# Patient Record
Sex: MALE | Race: WHITE | Employment: OTHER | URBAN - METROPOLITAN AREA
[De-identification: names, ages, dates, MRNs, and addresses within clinical notes are randomized per-mention and may not be internally consistent; named-entity substitution may affect disease eponyms.]

---

## 2017-01-01 ENCOUNTER — APPOINTMENT (OUTPATIENT)
Dept: GENERAL RADIOLOGY | Facility: HOSPITAL | Age: 72
DRG: 208 | End: 2017-01-01
Attending: HOSPITALIST
Payer: MEDICARE

## 2017-01-01 ENCOUNTER — OFFICE VISIT (OUTPATIENT)
Dept: CARDIOLOGY CLINIC | Facility: HOSPITAL | Age: 72
End: 2017-01-01
Attending: INTERNAL MEDICINE
Payer: MEDICARE

## 2017-01-01 ENCOUNTER — ANESTHESIA (OUTPATIENT)
Dept: MEDSURG UNIT | Facility: HOSPITAL | Age: 72
DRG: 208 | End: 2017-01-01
Payer: MEDICARE

## 2017-01-01 ENCOUNTER — APPOINTMENT (OUTPATIENT)
Dept: GENERAL RADIOLOGY | Facility: HOSPITAL | Age: 72
DRG: 208 | End: 2017-01-01
Attending: EMERGENCY MEDICINE
Payer: MEDICARE

## 2017-01-01 ENCOUNTER — APPOINTMENT (OUTPATIENT)
Dept: GENERAL RADIOLOGY | Facility: HOSPITAL | Age: 72
DRG: 208 | End: 2017-01-01
Attending: ANESTHESIOLOGY
Payer: MEDICARE

## 2017-01-01 ENCOUNTER — ANESTHESIA EVENT (OUTPATIENT)
Dept: MEDSURG UNIT | Facility: HOSPITAL | Age: 72
DRG: 208 | End: 2017-01-01
Payer: MEDICARE

## 2017-01-01 ENCOUNTER — APPOINTMENT (OUTPATIENT)
Dept: GENERAL RADIOLOGY | Facility: HOSPITAL | Age: 72
DRG: 208 | End: 2017-01-01
Attending: INTERNAL MEDICINE
Payer: MEDICARE

## 2017-01-01 ENCOUNTER — TELEPHONE (OUTPATIENT)
Dept: MEDSURG UNIT | Facility: HOSPITAL | Age: 72
End: 2017-01-01

## 2017-01-01 ENCOUNTER — HOSPITAL ENCOUNTER (INPATIENT)
Facility: HOSPITAL | Age: 72
LOS: 5 days | DRG: 208 | End: 2017-01-01
Attending: EMERGENCY MEDICINE | Admitting: HOSPITALIST
Payer: MEDICARE

## 2017-01-01 VITALS
RESPIRATION RATE: 18 BRPM | DIASTOLIC BLOOD PRESSURE: 72 MMHG | HEART RATE: 73 BPM | BODY MASS INDEX: 27 KG/M2 | OXYGEN SATURATION: 95 % | SYSTOLIC BLOOD PRESSURE: 115 MMHG | WEIGHT: 184 LBS

## 2017-01-01 VITALS
BODY MASS INDEX: 24.38 KG/M2 | WEIGHT: 178 LBS | DIASTOLIC BLOOD PRESSURE: 33 MMHG | SYSTOLIC BLOOD PRESSURE: 44 MMHG | OXYGEN SATURATION: 71 % | TEMPERATURE: 97 F | HEIGHT: 71.5 IN

## 2017-01-01 DIAGNOSIS — J96.21 ACUTE ON CHRONIC RESPIRATORY FAILURE WITH HYPOXIA (HCC): Primary | ICD-10-CM

## 2017-01-01 DIAGNOSIS — J84.10 PULMONARY FIBROSIS (HCC): ICD-10-CM

## 2017-01-01 DIAGNOSIS — I50.9 HEART FAILURE, UNSPECIFIED (HCC): Primary | ICD-10-CM

## 2017-01-01 DIAGNOSIS — E05.90 THYROTOXICOSIS: ICD-10-CM

## 2017-01-01 LAB
ANION GAP SERPL CALC-SCNC: 10 MMOL/L (ref 0–18)
ANION GAP SERPL CALC-SCNC: 13 MMOL/L (ref 0–18)
ANION GAP SERPL CALC-SCNC: 5 MMOL/L (ref 0–18)
ANION GAP SERPL CALC-SCNC: 6 MMOL/L (ref 0–18)
ANION GAP SERPL CALC-SCNC: 6 MMOL/L (ref 0–18)
ANION GAP SERPL CALC-SCNC: 7 MMOL/L (ref 0–18)
ANION GAP SERPL CALC-SCNC: 8 MMOL/L (ref 0–18)
BASE EXCESS BLD CALC-SCNC: 0.4 MMOL/L (ref ?–2)
BASE EXCESS BLD CALC-SCNC: 4.1 MMOL/L (ref ?–2)
BASOPHILS # BLD: 0 K/UL (ref 0–0.2)
BASOPHILS # BLD: 0.1 K/UL (ref 0–0.2)
BASOPHILS NFR BLD: 0 %
BLOOD GAS EPAP: 5 CM H2O
BLOOD GAS IPAP: 10 CM H2O
BNP SERPL-MCNC: 847 PG/ML (ref 0–100)
BUN SERPL-MCNC: 28 MG/DL (ref 8–20)
BUN SERPL-MCNC: 29 MG/DL (ref 8–20)
BUN SERPL-MCNC: 39 MG/DL (ref 8–20)
BUN SERPL-MCNC: 51 MG/DL (ref 8–20)
BUN SERPL-MCNC: 60 MG/DL (ref 8–20)
BUN SERPL-MCNC: 70 MG/DL (ref 8–20)
BUN SERPL-MCNC: 75 MG/DL (ref 8–20)
BUN/CREAT SERPL: 14.4 (ref 10–20)
BUN/CREAT SERPL: 17.9 (ref 10–20)
BUN/CREAT SERPL: 28.1 (ref 10–20)
BUN/CREAT SERPL: 31.4 (ref 10–20)
BUN/CREAT SERPL: 35.5 (ref 10–20)
BUN/CREAT SERPL: 37 (ref 10–20)
BUN/CREAT SERPL: 38 (ref 10–20)
CALCIUM SERPL-MCNC: 7.9 MG/DL (ref 8.5–10.5)
CALCIUM SERPL-MCNC: 8.6 MG/DL (ref 8.5–10.5)
CALCIUM SERPL-MCNC: 8.7 MG/DL (ref 8.5–10.5)
CALCIUM SERPL-MCNC: 8.9 MG/DL (ref 8.5–10.5)
CALCIUM SERPL-MCNC: 9.1 MG/DL (ref 8.5–10.5)
CALCIUM SERPL-MCNC: 9.2 MG/DL (ref 8.5–10.5)
CALCIUM SERPL-MCNC: 9.5 MG/DL (ref 8.5–10.5)
CHLORIDE SERPL-SCNC: 100 MMOL/L (ref 95–110)
CHLORIDE SERPL-SCNC: 102 MMOL/L (ref 95–110)
CHLORIDE SERPL-SCNC: 103 MMOL/L (ref 95–110)
CHLORIDE SERPL-SCNC: 105 MMOL/L (ref 95–110)
CHLORIDE SERPL-SCNC: 105 MMOL/L (ref 95–110)
CHLORIDE SERPL-SCNC: 98 MMOL/L (ref 95–110)
CHLORIDE SERPL-SCNC: 99 MMOL/L (ref 95–110)
CO2 SERPL-SCNC: 20 MMOL/L (ref 22–32)
CO2 SERPL-SCNC: 24 MMOL/L (ref 22–32)
CO2 SERPL-SCNC: 26 MMOL/L (ref 22–32)
CO2 SERPL-SCNC: 27 MMOL/L (ref 22–32)
CO2 SERPL-SCNC: 28 MMOL/L (ref 22–32)
CO2 SERPL-SCNC: 29 MMOL/L (ref 22–32)
CO2 SERPL-SCNC: 29 MMOL/L (ref 22–32)
CREAT SERPL-MCNC: 1.38 MG/DL (ref 0.5–1.5)
CREAT SERPL-MCNC: 1.39 MG/DL (ref 0.5–1.5)
CREAT SERPL-MCNC: 1.62 MG/DL (ref 0.5–1.5)
CREAT SERPL-MCNC: 1.69 MG/DL (ref 0.5–1.5)
CREAT SERPL-MCNC: 1.84 MG/DL (ref 0.5–1.5)
CREAT SERPL-MCNC: 1.95 MG/DL (ref 0.5–1.5)
CREAT SERPL-MCNC: 2.39 MG/DL (ref 0.5–1.5)
EOSINOPHIL # BLD: 0 K/UL (ref 0–0.7)
EOSINOPHIL NFR BLD: 0 %
ERYTHROCYTE [DISTWIDTH] IN BLOOD BY AUTOMATED COUNT: 17.5 % (ref 11–15)
ERYTHROCYTE [DISTWIDTH] IN BLOOD BY AUTOMATED COUNT: 17.6 % (ref 11–15)
ERYTHROCYTE [DISTWIDTH] IN BLOOD BY AUTOMATED COUNT: 17.7 % (ref 11–15)
ERYTHROCYTE [DISTWIDTH] IN BLOOD BY AUTOMATED COUNT: 18.1 % (ref 11–15)
ERYTHROCYTE [DISTWIDTH] IN BLOOD BY AUTOMATED COUNT: 18.3 % (ref 11–15)
GLUCOSE BLDC GLUCOMTR-MCNC: 148 MG/DL (ref 70–99)
GLUCOSE BLDC GLUCOMTR-MCNC: 149 MG/DL (ref 70–99)
GLUCOSE BLDC GLUCOMTR-MCNC: 165 MG/DL (ref 70–99)
GLUCOSE BLDC GLUCOMTR-MCNC: 172 MG/DL (ref 70–99)
GLUCOSE BLDC GLUCOMTR-MCNC: 175 MG/DL (ref 70–99)
GLUCOSE BLDC GLUCOMTR-MCNC: 180 MG/DL (ref 70–99)
GLUCOSE BLDC GLUCOMTR-MCNC: 181 MG/DL (ref 70–99)
GLUCOSE BLDC GLUCOMTR-MCNC: 183 MG/DL (ref 70–99)
GLUCOSE BLDC GLUCOMTR-MCNC: 196 MG/DL (ref 70–99)
GLUCOSE BLDC GLUCOMTR-MCNC: 209 MG/DL (ref 70–99)
GLUCOSE BLDC GLUCOMTR-MCNC: 213 MG/DL (ref 70–99)
GLUCOSE BLDC GLUCOMTR-MCNC: 214 MG/DL (ref 70–99)
GLUCOSE BLDC GLUCOMTR-MCNC: 217 MG/DL (ref 70–99)
GLUCOSE BLDC GLUCOMTR-MCNC: 218 MG/DL (ref 70–99)
GLUCOSE BLDC GLUCOMTR-MCNC: 230 MG/DL (ref 70–99)
GLUCOSE BLDC GLUCOMTR-MCNC: 234 MG/DL (ref 70–99)
GLUCOSE BLDC GLUCOMTR-MCNC: 238 MG/DL (ref 70–99)
GLUCOSE BLDC GLUCOMTR-MCNC: 242 MG/DL (ref 70–99)
GLUCOSE BLDC GLUCOMTR-MCNC: 245 MG/DL (ref 70–99)
GLUCOSE BLDC GLUCOMTR-MCNC: 248 MG/DL (ref 70–99)
GLUCOSE BLDC GLUCOMTR-MCNC: 256 MG/DL (ref 70–99)
GLUCOSE BLDC GLUCOMTR-MCNC: 260 MG/DL (ref 70–99)
GLUCOSE SERPL-MCNC: 130 MG/DL (ref 70–99)
GLUCOSE SERPL-MCNC: 184 MG/DL (ref 70–99)
GLUCOSE SERPL-MCNC: 187 MG/DL (ref 70–99)
GLUCOSE SERPL-MCNC: 189 MG/DL (ref 70–99)
GLUCOSE SERPL-MCNC: 192 MG/DL (ref 70–99)
GLUCOSE SERPL-MCNC: 239 MG/DL (ref 70–99)
GLUCOSE SERPL-MCNC: 246 MG/DL (ref 70–99)
HBA1C MFR BLD: 5.1 % (ref 4–6)
HCO3 BLDA-SCNC: 24.1 MEQ/L (ref 21–27)
HCO3 BLDA-SCNC: 26.6 MEQ/L (ref 21–27)
HCT VFR BLD AUTO: 32.8 % (ref 41–52)
HCT VFR BLD AUTO: 34.4 % (ref 41–52)
HCT VFR BLD AUTO: 39 % (ref 41–52)
HCT VFR BLD AUTO: 39.5 % (ref 41–52)
HCT VFR BLD AUTO: 40 % (ref 41–52)
HGB BLD-MCNC: 10.7 G/DL (ref 13.5–17.5)
HGB BLD-MCNC: 11.1 G/DL (ref 13.5–17.5)
HGB BLD-MCNC: 12.4 G/DL (ref 13.5–17.5)
HGB BLD-MCNC: 12.7 G/DL (ref 13.5–17.5)
HGB BLD-MCNC: 12.8 G/DL (ref 13.5–17.5)
LYMPHOCYTES # BLD: 0.5 K/UL (ref 1–4)
LYMPHOCYTES # BLD: 0.7 K/UL (ref 1–4)
LYMPHOCYTES # BLD: 0.9 K/UL (ref 1–4)
LYMPHOCYTES NFR BLD: 3 %
LYMPHOCYTES NFR BLD: 4 %
LYMPHOCYTES NFR BLD: 6 %
MAGNESIUM SERPL-MCNC: 2.1 MG/DL (ref 1.8–2.5)
MAGNESIUM SERPL-MCNC: 2.2 MG/DL (ref 1.8–2.5)
MAGNESIUM SERPL-MCNC: 2.2 MG/DL (ref 1.8–2.5)
MAGNESIUM SERPL-MCNC: 2.5 MG/DL (ref 1.8–2.5)
MCH RBC QN AUTO: 26.8 PG (ref 27–32)
MCH RBC QN AUTO: 27.1 PG (ref 27–32)
MCH RBC QN AUTO: 27.1 PG (ref 27–32)
MCH RBC QN AUTO: 27.2 PG (ref 27–32)
MCH RBC QN AUTO: 27.4 PG (ref 27–32)
MCHC RBC AUTO-ENTMCNC: 31.8 G/DL (ref 32–37)
MCHC RBC AUTO-ENTMCNC: 31.9 G/DL (ref 32–37)
MCHC RBC AUTO-ENTMCNC: 32.2 G/DL (ref 32–37)
MCHC RBC AUTO-ENTMCNC: 32.3 G/DL (ref 32–37)
MCHC RBC AUTO-ENTMCNC: 32.8 G/DL (ref 32–37)
MCV RBC AUTO: 83.7 FL (ref 80–100)
MCV RBC AUTO: 83.9 FL (ref 80–100)
MCV RBC AUTO: 84.1 FL (ref 80–100)
MCV RBC AUTO: 84.2 FL (ref 80–100)
MCV RBC AUTO: 85.3 FL (ref 80–100)
MODIFIED ALLEN TEST: POSITIVE
MODIFIED ALLEN TEST: POSITIVE
MONOCYTES # BLD: 0.8 K/UL (ref 0–1)
MONOCYTES # BLD: 0.8 K/UL (ref 0–1)
MONOCYTES # BLD: 1.1 K/UL (ref 0–1)
MONOCYTES # BLD: 1.1 K/UL (ref 0–1)
MONOCYTES # BLD: 1.2 K/UL (ref 0–1)
MONOCYTES NFR BLD: 5 %
MONOCYTES NFR BLD: 5 %
MONOCYTES NFR BLD: 6 %
MONOCYTES NFR BLD: 7 %
MONOCYTES NFR BLD: 8 %
MRSA DNA SPEC QL NAA+PROBE: NEGATIVE
NEUTROPHILS # BLD AUTO: 12.4 K/UL (ref 1.8–7.7)
NEUTROPHILS # BLD AUTO: 14.7 K/UL (ref 1.8–7.7)
NEUTROPHILS # BLD AUTO: 15.2 K/UL (ref 1.8–7.7)
NEUTROPHILS # BLD AUTO: 15.4 K/UL (ref 1.8–7.7)
NEUTROPHILS # BLD AUTO: 17.3 K/UL (ref 1.8–7.7)
NEUTROPHILS NFR BLD: 85 %
NEUTROPHILS NFR BLD: 89 %
NEUTROPHILS NFR BLD: 91 %
NEUTROPHILS NFR BLD: 92 %
NEUTROPHILS NFR BLD: 92 %
O2 CT BLD-SCNC: 14.9 VOL% (ref 15–23)
O2 CT BLD-SCNC: 15.5 VOL% (ref 15–23)
O2/TOTAL GAS SETTING VFR VENT: 100 %
O2/TOTAL GAS SETTING VFR VENT: 100 %
OSMOLALITY UR CALC.SUM OF ELEC: 286 MOSM/KG (ref 275–295)
OSMOLALITY UR CALC.SUM OF ELEC: 287 MOSM/KG (ref 275–295)
OSMOLALITY UR CALC.SUM OF ELEC: 296 MOSM/KG (ref 275–295)
OSMOLALITY UR CALC.SUM OF ELEC: 306 MOSM/KG (ref 275–295)
OSMOLALITY UR CALC.SUM OF ELEC: 307 MOSM/KG (ref 275–295)
OSMOLALITY UR CALC.SUM OF ELEC: 308 MOSM/KG (ref 275–295)
OSMOLALITY UR CALC.SUM OF ELEC: 312 MOSM/KG (ref 275–295)
PCO2 BLDA: 33 MM HG (ref 35–45)
PCO2 BLDA: 37 MM HG (ref 35–45)
PEEP SETTING VENT: 5 CM H2O
PH BLDA: 7.43 [PH] (ref 7.35–7.45)
PH BLDA: 7.51 [PH] (ref 7.35–7.45)
PLATELET # BLD AUTO: 167 K/UL (ref 140–400)
PLATELET # BLD AUTO: 178 K/UL (ref 140–400)
PLATELET # BLD AUTO: 179 K/UL (ref 140–400)
PLATELET # BLD AUTO: 214 K/UL (ref 140–400)
PLATELET # BLD AUTO: 215 K/UL (ref 140–400)
PMV BLD AUTO: 9.2 FL (ref 7.4–10.3)
PMV BLD AUTO: 9.5 FL (ref 7.4–10.3)
PMV BLD AUTO: 9.7 FL (ref 7.4–10.3)
PO2 BLDA: 52 MM HG (ref 80–100)
PO2 BLDA: 88 MM HG (ref 80–100)
PO2 SATN ADJ TO 0.5 BLD: 26 MMHG (ref 24–28)
PO2 SATN ADJ TO 0.5 BLD: 29 MMHG (ref 24–28)
POTASSIUM SERPL-SCNC: 4.3 MMOL/L (ref 3.3–5.1)
POTASSIUM SERPL-SCNC: 4.6 MMOL/L (ref 3.3–5.1)
POTASSIUM SERPL-SCNC: 4.7 MMOL/L (ref 3.3–5.1)
POTASSIUM SERPL-SCNC: 4.8 MMOL/L (ref 3.3–5.1)
POTASSIUM SERPL-SCNC: 4.8 MMOL/L (ref 3.3–5.1)
POTASSIUM SERPL-SCNC: 5 MMOL/L (ref 3.3–5.1)
POTASSIUM SERPL-SCNC: 5.1 MMOL/L (ref 3.3–5.1)
PUNCTURE CHARGE: YES
PUNCTURE CHARGE: YES
RBC # BLD AUTO: 3.91 M/UL (ref 4.5–5.9)
RBC # BLD AUTO: 4.09 M/UL (ref 4.5–5.9)
RBC # BLD AUTO: 4.57 M/UL (ref 4.5–5.9)
RBC # BLD AUTO: 4.71 M/UL (ref 4.5–5.9)
RBC # BLD AUTO: 4.76 M/UL (ref 4.5–5.9)
RESP RATE: 14 BPM
SAO2 % BLDA: 87 % (ref 94–100)
SAO2 % BLDA: 96 % (ref 94–100)
SODIUM SERPL-SCNC: 131 MMOL/L (ref 136–144)
SODIUM SERPL-SCNC: 133 MMOL/L (ref 136–144)
SODIUM SERPL-SCNC: 135 MMOL/L (ref 136–144)
SODIUM SERPL-SCNC: 136 MMOL/L (ref 136–144)
SODIUM SERPL-SCNC: 136 MMOL/L (ref 136–144)
SODIUM SERPL-SCNC: 139 MMOL/L (ref 136–144)
SODIUM SERPL-SCNC: 140 MMOL/L (ref 136–144)
SPECIMEN VOL 24H UR: 550 ML
T3FREE SERPL-MCNC: 2.85 PG/ML (ref 2.53–4.29)
T4 FREE SERPL-MCNC: 1.14 NG/DL (ref 0.58–1.64)
TSH SERPL-ACNC: 2.87 UIU/ML (ref 0.34–5.6)
WBC # BLD AUTO: 14.5 K/UL (ref 4–11)
WBC # BLD AUTO: 16.5 K/UL (ref 4–11)
WBC # BLD AUTO: 16.6 K/UL (ref 4–11)
WBC # BLD AUTO: 16.7 K/UL (ref 4–11)
WBC # BLD AUTO: 19 K/UL (ref 4–11)

## 2017-01-01 PROCEDURE — 71010 XR CHEST AP PORTABLE  (CPT=71010): CPT

## 2017-01-01 PROCEDURE — 99233 SBSQ HOSP IP/OBS HIGH 50: CPT | Performed by: HOSPITALIST

## 2017-01-01 PROCEDURE — 84443 ASSAY THYROID STIM HORMONE: CPT | Performed by: CLINICAL NURSE SPECIALIST

## 2017-01-01 PROCEDURE — 99214 OFFICE O/P EST MOD 30 MIN: CPT | Performed by: CLINICAL NURSE SPECIALIST

## 2017-01-01 PROCEDURE — 80048 BASIC METABOLIC PNL TOTAL CA: CPT | Performed by: CLINICAL NURSE SPECIALIST

## 2017-01-01 PROCEDURE — 99233 SBSQ HOSP IP/OBS HIGH 50: CPT | Performed by: INTERNAL MEDICINE

## 2017-01-01 PROCEDURE — 99291 CRITICAL CARE FIRST HOUR: CPT | Performed by: INTERNAL MEDICINE

## 2017-01-01 PROCEDURE — 99223 1ST HOSP IP/OBS HIGH 75: CPT | Performed by: HOSPITALIST

## 2017-01-01 PROCEDURE — 5A1945Z RESPIRATORY VENTILATION, 24-96 CONSECUTIVE HOURS: ICD-10-PCS | Performed by: INTERNAL MEDICINE

## 2017-01-01 PROCEDURE — 0BH17EZ INSERTION OF ENDOTRACHEAL AIRWAY INTO TRACHEA, VIA NATURAL OR ARTIFICIAL OPENING: ICD-10-PCS | Performed by: ANESTHESIOLOGY

## 2017-01-01 PROCEDURE — 36415 COLL VENOUS BLD VENIPUNCTURE: CPT | Performed by: CLINICAL NURSE SPECIALIST

## 2017-01-01 PROCEDURE — 84481 FREE ASSAY (FT-3): CPT | Performed by: CLINICAL NURSE SPECIALIST

## 2017-01-01 PROCEDURE — 84439 ASSAY OF FREE THYROXINE: CPT | Performed by: CLINICAL NURSE SPECIALIST

## 2017-01-01 PROCEDURE — 99238 HOSP IP/OBS DSCHRG MGMT 30/<: CPT | Performed by: HOSPITALIST

## 2017-01-01 PROCEDURE — 99211 OFF/OP EST MAY X REQ PHY/QHP: CPT | Performed by: CLINICAL NURSE SPECIALIST

## 2017-01-01 PROCEDURE — 99223 1ST HOSP IP/OBS HIGH 75: CPT | Performed by: INTERNAL MEDICINE

## 2017-01-01 RX ORDER — IPRATROPIUM BROMIDE AND ALBUTEROL SULFATE 2.5; .5 MG/3ML; MG/3ML
3 SOLUTION RESPIRATORY (INHALATION) AS NEEDED
Status: DISCONTINUED | OUTPATIENT
Start: 2017-01-01 | End: 2017-01-01

## 2017-01-01 RX ORDER — SPIRONOLACTONE 25 MG/1
25 TABLET ORAL DAILY
Status: DISCONTINUED | OUTPATIENT
Start: 2017-01-01 | End: 2017-01-01

## 2017-01-01 RX ORDER — IPRATROPIUM BROMIDE AND ALBUTEROL SULFATE 2.5; .5 MG/3ML; MG/3ML
3 SOLUTION RESPIRATORY (INHALATION)
Status: DISCONTINUED | OUTPATIENT
Start: 2017-01-01 | End: 2017-01-01

## 2017-01-01 RX ORDER — ISOSORBIDE DINITRATE 10 MG/1
10 TABLET ORAL
Status: DISCONTINUED | OUTPATIENT
Start: 2017-01-01 | End: 2017-01-01

## 2017-01-01 RX ORDER — METHIMAZOLE 10 MG/1
5 TABLET ORAL EVERY MORNING
Status: DISCONTINUED | OUTPATIENT
Start: 2017-01-01 | End: 2017-01-01

## 2017-01-01 RX ORDER — METHYLPREDNISOLONE SODIUM SUCCINATE 40 MG/ML
40 INJECTION, POWDER, LYOPHILIZED, FOR SOLUTION INTRAMUSCULAR; INTRAVENOUS EVERY 6 HOURS
Status: DISCONTINUED | OUTPATIENT
Start: 2017-01-01 | End: 2017-01-01

## 2017-01-01 RX ORDER — FUROSEMIDE 10 MG/ML
40 INJECTION INTRAMUSCULAR; INTRAVENOUS
Status: DISCONTINUED | OUTPATIENT
Start: 2017-01-01 | End: 2017-01-01

## 2017-01-01 RX ORDER — METHYLPREDNISOLONE SODIUM SUCCINATE 40 MG/ML
40 INJECTION, POWDER, LYOPHILIZED, FOR SOLUTION INTRAMUSCULAR; INTRAVENOUS EVERY 12 HOURS
Status: COMPLETED | OUTPATIENT
Start: 2017-01-01 | End: 2017-01-01

## 2017-01-01 RX ORDER — MORPHINE SULFATE 2 MG/ML
INJECTION, SOLUTION INTRAMUSCULAR; INTRAVENOUS
Status: COMPLETED
Start: 2017-01-01 | End: 2017-01-01

## 2017-01-01 RX ORDER — SODIUM CHLORIDE 0.9 % (FLUSH) 0.9 %
SYRINGE (ML) INJECTION
Status: COMPLETED
Start: 2017-01-01 | End: 2017-01-01

## 2017-01-01 RX ORDER — 0.9 % SODIUM CHLORIDE 0.9 %
VIAL (ML) INJECTION
Status: DISCONTINUED
Start: 2017-01-01 | End: 2017-01-01

## 2017-01-01 RX ORDER — ROSUVASTATIN CALCIUM 20 MG/1
20 TABLET, COATED ORAL NIGHTLY
Status: DISCONTINUED | OUTPATIENT
Start: 2017-01-01 | End: 2017-01-01

## 2017-01-01 RX ORDER — FUROSEMIDE 10 MG/ML
INJECTION INTRAMUSCULAR; INTRAVENOUS
Status: COMPLETED
Start: 2017-01-01 | End: 2017-01-01

## 2017-01-01 RX ORDER — DIPHENHYDRAMINE HCL 25 MG
25 CAPSULE ORAL ONCE
Status: COMPLETED | OUTPATIENT
Start: 2017-01-01 | End: 2017-01-01

## 2017-01-01 RX ORDER — DIPHENHYDRAMINE HCL 25 MG
25 CAPSULE ORAL NIGHTLY PRN
Status: DISCONTINUED | OUTPATIENT
Start: 2017-01-01 | End: 2017-01-01

## 2017-01-01 RX ORDER — MORPHINE SULFATE 2 MG/ML
1 INJECTION, SOLUTION INTRAMUSCULAR; INTRAVENOUS ONCE
Status: COMPLETED | OUTPATIENT
Start: 2017-01-01 | End: 2017-01-01

## 2017-01-01 RX ORDER — METHYLPREDNISOLONE SODIUM SUCCINATE 40 MG/ML
40 INJECTION, POWDER, LYOPHILIZED, FOR SOLUTION INTRAMUSCULAR; INTRAVENOUS EVERY 12 HOURS
Status: DISCONTINUED | OUTPATIENT
Start: 2017-01-01 | End: 2017-01-01

## 2017-01-01 RX ORDER — SODIUM CHLORIDE 9 MG/ML
INJECTION, SOLUTION INTRAVENOUS CONTINUOUS
Status: DISCONTINUED | OUTPATIENT
Start: 2017-01-01 | End: 2017-01-01

## 2017-01-01 RX ORDER — METHIMAZOLE 10 MG/1
5 TABLET ORAL ONCE
Status: COMPLETED | OUTPATIENT
Start: 2017-01-01 | End: 2017-01-01

## 2017-01-01 RX ORDER — FUROSEMIDE 10 MG/ML
40 INJECTION INTRAMUSCULAR; INTRAVENOUS DAILY
Status: DISCONTINUED | OUTPATIENT
Start: 2017-01-01 | End: 2017-01-01

## 2017-01-01 RX ORDER — ASPIRIN 81 MG/1
81 TABLET ORAL DAILY
Status: DISCONTINUED | OUTPATIENT
Start: 2017-01-01 | End: 2017-01-01

## 2017-01-01 RX ORDER — 0.9 % SODIUM CHLORIDE 0.9 %
VIAL (ML) INJECTION
Status: COMPLETED
Start: 2017-01-01 | End: 2017-01-01

## 2017-01-01 RX ORDER — ONDANSETRON 2 MG/ML
4 INJECTION INTRAMUSCULAR; INTRAVENOUS EVERY 6 HOURS PRN
Status: DISCONTINUED | OUTPATIENT
Start: 2017-01-01 | End: 2017-01-01

## 2017-01-01 RX ORDER — PREDNISONE 20 MG/1
40 TABLET ORAL
Status: DISCONTINUED | OUTPATIENT
Start: 2017-01-01 | End: 2017-01-01

## 2017-01-01 RX ORDER — PREDNISONE 10 MG/1
TABLET ORAL
Qty: 100 TABLET | Refills: 0 | Status: SHIPPED | OUTPATIENT
Start: 2017-01-01

## 2017-01-01 RX ORDER — HYDRALAZINE HYDROCHLORIDE 10 MG/1
10 TABLET, FILM COATED ORAL 3 TIMES DAILY
Status: DISCONTINUED | OUTPATIENT
Start: 2017-01-01 | End: 2017-01-01

## 2017-01-01 RX ORDER — METHYLPREDNISOLONE SODIUM SUCCINATE 125 MG/2ML
125 INJECTION, POWDER, LYOPHILIZED, FOR SOLUTION INTRAMUSCULAR; INTRAVENOUS ONCE
Status: COMPLETED | OUTPATIENT
Start: 2017-01-01 | End: 2017-01-01

## 2017-01-01 RX ORDER — ISOSORBIDE DINITRATE 10 MG/1
10 TABLET ORAL 3 TIMES DAILY
Status: DISCONTINUED | OUTPATIENT
Start: 2017-01-01 | End: 2017-01-01

## 2017-01-01 RX ORDER — SODIUM CHLORIDE 0.9 % (FLUSH) 0.9 %
SYRINGE (ML) INJECTION
Status: DISPENSED
Start: 2017-01-01 | End: 2017-01-01

## 2017-01-01 RX ORDER — DEXTROSE MONOHYDRATE 25 G/50ML
50 INJECTION, SOLUTION INTRAVENOUS AS NEEDED
Status: DISCONTINUED | OUTPATIENT
Start: 2017-01-01 | End: 2017-01-01

## 2017-01-01 RX ORDER — ROSUVASTATIN CALCIUM 20 MG/1
20 TABLET, COATED ORAL NIGHTLY
Qty: 30 TABLET | Refills: 4 | Status: SHIPPED | OUTPATIENT
Start: 2017-01-01

## 2017-01-01 RX ORDER — CARVEDILOL 12.5 MG/1
12.5 TABLET ORAL 2 TIMES DAILY WITH MEALS
Status: DISCONTINUED | OUTPATIENT
Start: 2017-01-01 | End: 2017-01-01

## 2017-01-01 RX ORDER — ACETAMINOPHEN 325 MG/1
650 TABLET ORAL EVERY 6 HOURS PRN
Status: DISCONTINUED | OUTPATIENT
Start: 2017-01-01 | End: 2017-01-01

## 2017-01-01 RX ORDER — SODIUM CHLORIDE 9 MG/ML
INJECTION, SOLUTION INTRAVENOUS
Status: COMPLETED
Start: 2017-01-01 | End: 2017-01-01

## 2017-01-01 RX ORDER — IPRATROPIUM BROMIDE AND ALBUTEROL SULFATE 2.5; .5 MG/3ML; MG/3ML
3 SOLUTION RESPIRATORY (INHALATION) ONCE
Status: COMPLETED | OUTPATIENT
Start: 2017-01-01 | End: 2017-01-01

## 2017-01-01 RX ORDER — DEXTROSE AND SODIUM CHLORIDE 5; .9 G/100ML; G/100ML
INJECTION, SOLUTION INTRAVENOUS CONTINUOUS
Status: DISCONTINUED | OUTPATIENT
Start: 2017-01-01 | End: 2017-01-01

## 2017-01-03 ENCOUNTER — PRIOR ORIGINAL RECORDS (OUTPATIENT)
Dept: OTHER | Age: 72
End: 2017-01-03

## 2017-01-09 NOTE — TELEPHONE ENCOUNTER
Pt called for refill:     Current outpatient prescriptions:     •  predniSONE 10 MG Oral Tab, Take 3 tablets by mouth daily, Disp: 100 tablet, Rfl: 0

## 2017-01-19 NOTE — PROGRESS NOTES
[facility]    1. Acute on chronic systolic heart failure - Stress 11/25/16- EF 46%  2. VT storm Hx - failed sotalol  3. ? Pulmonary Fibrosis/Amio Toxicity - amiodarone stopped           4. COPD  5. CAD Hx CABG  6. CKD  7. HTN  8.  H lightheadedness, chest pain, or palpitations. Reviewed disease process, na/fluid intake, medications; greater than 30 minutes spent on education - receptive. Trace BLE edema.  Tapering prednisone per Dr Varsha Arredondo for pulmonary fibrosis related to questionable a

## 2017-01-19 NOTE — PATIENT INSTRUCTIONS
Continue current medications  Continue tracking daily weights. Call with weight gain greater than 3 lbs overnight or concerning symptoms.   32-64 oz fluid restriction  2000 mg sodium/salt diet  Pacemaker clinic appointment 1/20/17  Appointment with Dr Garcia Gins

## 2017-01-20 ENCOUNTER — PRIOR ORIGINAL RECORDS (OUTPATIENT)
Dept: OTHER | Age: 72
End: 2017-01-20

## 2017-02-07 PROBLEM — R73.9 HYPERGLYCEMIA: Status: ACTIVE | Noted: 2017-01-01

## 2017-02-07 PROBLEM — E87.1 HYPONATREMIA: Status: ACTIVE | Noted: 2017-01-01

## 2017-02-07 PROBLEM — N17.9 ACUTE KIDNEY INJURY (HCC): Status: ACTIVE | Noted: 2017-01-01

## 2017-02-07 PROBLEM — E87.20 METABOLIC ACIDOSIS: Status: ACTIVE | Noted: 2017-01-01

## 2017-02-07 PROBLEM — J96.21 ACUTE ON CHRONIC RESPIRATORY FAILURE WITH HYPOXIA (HCC): Status: ACTIVE | Noted: 2017-01-01

## 2017-02-07 PROBLEM — E87.2 METABOLIC ACIDOSIS: Status: ACTIVE | Noted: 2017-01-01

## 2017-02-07 NOTE — ED INITIAL ASSESSMENT (HPI)
Patient has pulmonary fibrosis and has been on 4L O2 at home for the last several months. Patient began having increased shortness of breath today but decided not to go to dr. Tracie nevarez and EMS was called.  Patient was 45% SpO2 upon EMS arrival.

## 2017-02-07 NOTE — CONSULTS
[facility]    Report of Consultation    Yajaira Cortes Patient Status:  Inpatient    3/24/1945 MRN [id number]   Location [facility] 2W/SW Attending Amber Sheldon, [address] [facility] Day # 0 PCP Rosa Maria Holcomb MD     Date of Admiss s/p surgical thrombectomy 5/16   • Asthma    • Sleep apnea    • Calculus of kidney    • Heart attack [facility])    • Peripheral vascular disease (HCC)    • Disorder of thyroid      hyperthyroidism   • Carotid arterial disease (HCC)      s/p L CEA   • CVA (c (COREG) tab 12.5 mg 12.5 mg Oral BID with meals   hydrALAzine HCl (APRESOLINE) tab 10 mg 10 mg Oral TID   rivaroxaban (XARELTO) tab 15 mg 15 mg Oral Daily with food   Rosuvastatin Calcium (CRESTOR) 20 MG tab 20 mg 20 mg Oral Nightly   ondansetron HCl (ZOFR by mouth daily. MACHO MICROLET LANCETS Does not apply Misc 1 lancet by Finger stick route daily. Once daily and PRN. Glucose Blood (MACHO CONTOUR NEXT TEST) In Vitro Strip Check Blood Glucose once daily and PRN.    carvedilol 12.5 MG Oral Tab Take 12.5 CA 8.6 02/07/2017   ALB 2.8* 11/25/2016   ALKPHO 90 10/14/2016   TP 6.8 10/14/2016   AST 35 10/14/2016   ALT 51 10/14/2016   PTT 28.1 10/20/2016   INR 1.6* 11/23/2016   PTP 19.2* 11/23/2016   T4F 1.14 01/19/2017   TSH 2.87 01/19/2017   * 07/20/201 consistent with pulmonary fibrosis. May have component of acute on chronic systolic heart failure.   .  -Gentle diuresis as tolerated  -We will quickly wean steroid therapy  -Hold off antibiotics at this time  -Nightly CPAP  -Tolerating Vapotherm at

## 2017-02-07 NOTE — PROGRESS NOTES
See H and P from Dr Gaby Frank. Pt better off bipap, on vapor nc. Discussed with Dr Joanna Alicea. Unlikely copd. Will taper steroids. Cont iv lasix for now. Check BMP in am to monitor creatinine, which is above his baseline.

## 2017-02-07 NOTE — H&P
[address] [facility] Patient Status:  Inpatient    3/24/1945 MRN [id number]   Location [facility] 2W/ Attending Danita Fields MD   Hosp Day # 0 PCP Davida Montes De Oca MD     Date:  2017  Da thrombectomy 5/16   • Asthma    • Sleep apnea    • Calculus of kidney    • Heart attack [facility])    • Peripheral vascular disease (HCC)    • Disorder of thyroid      hyperthyroidism   • Carotid arterial disease (HCC)      s/p L CEA   • CVA (cerebral vascular Medications   Prescriptions Last Dose Informant Patient Reported? Taking? Albuterol Sulfate  (90 BASE) MCG/ACT Inhalation Aero Soln Unknown at Unknown time  No No   Sig: Inhale 2 puffs into the lungs 4 (four) times daily as needed for Wheezing. 2/6/2017 at 0800  No Yes   Sig: Take 1 tablet (25 mg total) by mouth daily. Facility-Administered Medications: None       Review of Systems:  Constitutional:  Weakness, Fatigue. Eye:  Negative. Ear/Nose/Mouth/Throat:  Negative.   Respiratory: Shortne 02/07/2017    02/07/2017   K 4.8 02/07/2017   CL 98 02/07/2017   CO2 20 02/07/2017    02/07/2017   CA 8.6 02/07/2017       Imaging:  No exam resulted this encounter.     Assessment and Plan:    Acute on chronic hypoxic respiratory failure  Like

## 2017-02-07 NOTE — ED PROVIDER NOTES
Patient Seen in: [facility]    History   Patient presents with:  Dyspnea PRAVIN SOB (respiratory)      HPI    Patient presents complaining of shortness of breath that started earlier today.   He states that he has a history of pul iliac endarterectomy, placement of 20 x 10 mm bifurcation graft from infrarenal abdominal aorta end-to-end to R common iliac & L common iliac    TONSILLECTOMY      HERNIA SURGERY  11/14/2012    Comment B/L percutaneous endoscopic external ring hernioplasty Disp:  Rfl:  2/6/2017 at [facility] Take 5 mg by mouth every morning. Take 5 mg in AM and 2.5 mg in the Pm  Disp:  Rfl:  2/6/2017 at 0800   spironolactone 25 MG Oral Tab Take 1 tablet (25 mg total) by mouth daily.  Disp: 90 tablet Rfl: 3 2/6/2017 Positive for shortness of breath. Negative for cough. Cardiovascular: Negative for chest pain and palpitations. Gastrointestinal: Negative for vomiting and abdominal pain. Genitourinary: Negative for dysuria and hematuria.    Musculoskeletal: Veronica Albrecht within normal limits   POCT GLUCOSE - Abnormal; Notable for the following:     POC Glucose  181 (*)     All other components within normal limits   CBC W/ DIFFERENTIAL - Abnormal; Notable for the following:     WBC 14.5 (*)     HGB 12.7 (*)     HCT 39.5 (* prominent interstitial markings bilaterally. The findings are nonspecific and could be related to the patient's underlying pulmonary fibrosis. Correlation can be made clinically for any superimposed interstitial pulmonary edema or pneumonitis.     Kelly Hammond acidosis E87.2 2/7/2017 Yes    Pulmonary fibrosis ([facility] [address] J84.10 Unknown Unknown

## 2017-02-08 NOTE — PLAN OF CARE
Diabetes/Glucose Control    • Glucose maintained within prescribed range Not Progressing    IV steroids, elevated blood sugar. High sliding scale coverage at meals.       RESPIRATORY - ADULT    • Achieves optimal ventilation and oxygenation Progressing

## 2017-02-08 NOTE — HISTORICAL OFFICE NOTE
Mouna Campos  : 1945  ACCOUNT:  [id number]  [phone]  PCP: Dr. Nikky Norris    TODAY'S DATE: 2016  DICTATED BY:  Tejal Jimenez M.D.]    CHIEF COMPLAINT: [Followup of .  CAD, of native vessels.]    HPI:  [On 2016, [employer] CONS: fatigue and weakness. EYES: denies significant visual changes. ENMT: denies difficulties with hearing, otherwise negative. CV: Denies chest pain, dizziness, palpitations. RESP: denies dyspnea, cough or wheezing. GI: denies melena, hematochezia.  : n CV: PALP: PMI not displaced, no lifts and thrills or rub. AUSC:  regular rhythm, normal S1, S2 without S3; no pathologic murmurs. CAROTIDS: carotid pulses normal and without bruits. FEM: deferred. PEDAL: pedal pulses intact. EXT: no peripheral edema.     DE 05/29/15 Multivitamin Adults 50          daily      Beka Soria M.D.    NILS/deedee - DD: 2016 - DT: 2016 - Job ID: [id number]   C: Dr. Arlene Mayers  : 1945  ACCOUNT:  [id number]  [phone]  PCP: Dr. Ro Vazquez CONS: doing well. EYES: denies significant visual changes. ENMT: denies difficulties with hearing, otherwise negative. CV: Denies chest pain, dizziness, palpitations. RESP: denies dyspnea, cough or wheezing. GI: denies melena, hematochezia.  : no hematuri CV: PALP: PMI not displaced, no lifts and thrills or rub. AUSC:  regular rhythm, normal S1, S2 without S3; no pathologic murmurs. FEM: deferred. PEDAL: pedal pulses intact. EXT: no peripheral edema. LABORATORY DATA: [TSH low at 0.07.   He is on amiodaron 05/31/16 Furosemide            20MG      daily  05/31/16 MetFORMIN HCl ER (MOD)1000MG    daily  05/31/16 Methimazole           5MG       three times daily  04/08/16 Amiodarone HCl        200MG     1 tab twice daily  04/08/16 Spironolactone        25MG

## 2017-02-08 NOTE — PROGRESS NOTES
[facility]    Progress Note    Williams Mcnamara Patient Status:  Inpatient    3/24/1945 MRN [id number]   [facility] 2W/SW Attending Kathy Radford Day # 1 PCP Michael Moon MD     Subjective: hold metformin for now. Acute on chronic kidney disease stage III  We will avoid nephrotoxic medication, renally dose current meds repeat BMP in a.m.     Prophylaxis  Continue Xarelto    CODE STATUS  Full    Primary care physician  Angel Marroquin MD

## 2017-02-08 NOTE — PROGRESS NOTES
Pulmonary/Critical Care Follow Up Note    HPI:   Sally Miramontes is a 73year old male with Patient presents with:  Dyspnea PRAVIN SOB (respiratory)      PCP Laila Norris MD  Admission Attending Mingo Gutierrez 15 Day #1    Feeling more sob Q6H PRN  •  acetaminophen (TYLENOL) tab 650 mg, 650 mg, Oral, Q6H PRN  •  dextrose injection 50 mL, 50 mL, Intravenous, PRN  •  insulin aspart (NOVOLOG) 100 UNIT/ML flexpen 1-11 Units, 1-11 Units, Subcutaneous, TID CC  •  aspirin EC tab 81 mg, 81 mg, Oral, AECOPD  Plan nebs   Steroids     4.  Chronic systolic heart failure    5.  Obstructive sleep apnea  Plan cont CPAP at night (if not on bipap)    6.  Elevated troponin  Plan NAJMA Goodwin MD

## 2017-02-09 NOTE — PROGRESS NOTES
[facility]    Progress Note    Milas Friend Patient Status:  Inpatient    3/24/1945 MRN [id number]   [facility] 2W/SW Attending Kathy Radford Day # 2 PCP Angel Marroquin MD     Subjective: sliding scale coverage hold metformin for now. Acute on chronic kidney disease stage III  We will avoid nephrotoxic medication, renally dose current meds repeat BMP in a.m.     Prophylaxis  Continue Xarelto    CODE STATUS  Full    Primary care physician

## 2017-02-09 NOTE — PROGRESS NOTES
[facility]  Progress Note    Zoraida Like Patient Status:  Inpatient    3/24/1945 MRN [id number]   [facility] 2W/SW Attending Kathy Radford Day # 2 PCP Nikky Norris MD     Assessment:    1.  Exacerbatio 02/09/2017   MG 2.1 02/09/2017   CA 9.2 02/09/2017          Lab Results  Component Value Date   TROP 0.04* 11/23/2016   TROP 0.04* 11/23/2016   TROP 0.04* 11/23/2016        Medications:    • Normal Saline Flush       • furosemide  40 mg Intravenous BID Geeta Fields

## 2017-02-09 NOTE — CONSULTS
[facility]    Report of Consultation    Samir [facility] Patient Status:  Inpatient    3/24/1945 MRN [id number]   [facility] 2W/SW Attending Kathy Radfordissa Day # 1 PCP Becky Julian MD     Date of A significant for coronary disease cardiomyopathy LV dysfunction embolus from his LV to his leg defibrillator acute on chronic renal disease. He has had no recent fever chills. He again does note a mild cough but that shortness of breath.   Patient used to placement of 20 x 10 mm bifurcation graft from infrarenal abdominal aorta end-to-end to R common iliac & L common iliac    TONSILLECTOMY      HERNIA SURGERY  11/14/2012    Comment B/L percutaneous endoscopic external ring hernioplasty w/ mesh (mesh used is mg 650 mg Oral Q6H PRN   dextrose injection 50 mL 50 mL Intravenous PRN   insulin aspart (NOVOLOG) 100 UNIT/ML flexpen 1-11 Units 1-11 Units Subcutaneous TID CC   aspirin EC tab 81 mg 81 mg Oral Daily   spironolactone (ALDACTONE) tab 25 mg 25 mg Oral Daily daily.   aspirin 81 MG Oral Tab Take 81 mg by mouth daily. Albuterol Sulfate  (90 BASE) MCG/ACT Inhalation Aero Soln Inhale 2 puffs into the lungs 4 (four) times daily as needed for Wheezing.        Allergies        No Known Allergies    Review of sounds. Lungs: Basilar crackles. Normal excursions and effort. Abdomen:  Soft, non-tender. No organosplenomegally, mass or rebound, BS-present. Extremities:  Without clubbing, cyanosis or edema. Peripheral pulses are 2+.   Neurologic:  Alert and orient

## 2017-02-10 NOTE — PROGRESS NOTES
[facility]  Progress Note    Georganna Cogan Patient Status:  Inpatient    3/24/1945 MRN [id number]   [facility] 2W/SW Attending Kathy Radford [place] Day # 3 PCP Juliano Forte MD     Assessment:    1.  Exacerbatio 02/10/2017    02/10/2017   CO2 29 02/10/2017   BUN 60 02/10/2017   CREATSERUM 1.69 02/10/2017    02/10/2017   MG 2.2 02/10/2017   CA 9.5 02/10/2017          Lab Results  Component Value Date   TROP 0.04* 11/23/2016   TROP 0.04* 11/23/2016   TR

## 2017-02-10 NOTE — PROGRESS NOTES
Pulmonary/Critical Care Follow Up Note    HPI:   Marc Shahid is a 73year old male with Patient presents with:  Dyspnea PRAVIN SOB (respiratory)      PCP Ashlyn Camilo MD  Admission Attending Mingo Montemayor 15 Day #3    SOB  Cough  ?  fe rivaroxaban (XARELTO) tab 15 mg, 15 mg, Oral, Daily with food  •  Rosuvastatin Calcium (CRESTOR) 20 MG tab 20 mg, 20 mg, Oral, Nightly  •  ondansetron HCl (ZOFRAN) injection 4 mg, 4 mg, Intravenous, Q6H PRN  •  acetaminophen (TYLENOL) tab 650 mg, 650 mg, O needed                  MDVC steroids   Consider repeat CT chest     2.  Amiodarone pulmonary toxicity  Was on 30 mg pred and about to wean to 20  His sx had resolved until recent acute decompensation  Plan   Cont sterroids but add back IV

## 2017-02-10 NOTE — PROGRESS NOTES
Pulmonary/Critical Care Follow Up Note    HPI:   Sally Miramontes is a 73year old male with Patient presents with:  Dyspnea PRAVIN SOB (respiratory)      PCP Laila Norris MD  Admission Attending Mingo Gutierrez 15 Day #2    SOB  Cough  Not mg, 10 mg, Oral, TID  •  rivaroxaban (XARELTO) tab 15 mg, 15 mg, Oral, Daily with food  •  Rosuvastatin Calcium (CRESTOR) 20 MG tab 20 mg, 20 mg, Oral, Nightly  •  ondansetron HCl (ZOFRAN) injection 4 mg, 4 mg, Intravenous, Q6H PRN  •  acetaminophen (TYLEN if needed                  cont steroids   Consider repeat CT chest     2.  Amiodarone pulmonary toxicity  Was on 30 mg pred and about to wean to 20  His sx had resolved until recent acute decompensation  Plan   Cont sterroids                  No amio    3

## 2017-02-10 NOTE — PROGRESS NOTES
[facility]    Progress Note    Kay Nuno Patient Status:  Inpatient    3/24/1945 MRN [id number]   [facility] 2W/SW Attending Kathy Radford Day # 3 PCP Arleen Brooke MD     Subjective: medication, renally dose current meds repeat BMP in a.m.     Prophylaxis  Continue Xarelto    CODE STATUS  Full    Primary care physician  Ray Lawrence MD    Disposition  Unknown ultimately pcu for now     32 min spent on pt of which 20 min spent

## 2017-02-11 NOTE — ANESTHESIA PREPROCEDURE EVALUATION
Anesthesia PreOp Note    HPI:     Alfredo Bob is a 73year old male who presents for preoperative consultation requested by: * No surgeons listed *    Date of Surgery: * No dates entered *    * No procedures listed *  Indication: * No pre-op diagno bronchitis 2014   • Cataract      cataracts   • AAA (abdominal aortic aneurysm) ([facility]      s/p repair 2008   • COPD (chronic obstructive pulmonary disease) (HCC)    • Peripheral neuropathy ([facility] 2012   • GERD (gastroesophageal reflux disease)    • Herpes z CATARACT      CAROTID ENDARTERECTOMY Left 10/24/2016    Comment Procedure: CAROTID ENDARTERECTOMY;  Surgeon: Koby Barrios MD;  Location: [address] [place] [address] MAIN OR         Prescriptions prior to admission:  Rosuvastatin Calcium 20 MG Oral Tab Take 1 tablet (20 mg daily. Disp:  Rfl:  2/6/2017 at Unknown time   Albuterol Sulfate  (90 BASE) MCG/ACT Inhalation Aero Soln Inhale 2 puffs into the lungs 4 (four) times daily as needed for Wheezing.  Disp: 1 Inhaler Rfl: 5 Unknown at Unknown time       Current Facility mg at 02/11/17 1007   methimazole (TAPAZOLE) tab 5 mg 5 mg Oral QAM Misa Jimenez MD 5 mg at 02/11/17 1007   insulin detemir (LEVEMIR) 100 UNIT/ML flextouch 10 Units 10 Units Subcutaneous Nightly Sallee Dance, DO 10 Units at 02/10/17 7333   insulin 02/11/17  1300 02/11/17  1400 02/11/17  1526   BP:  116/71 140/68    Pulse: 89 91 99 96   Temp:       TempSrc:       Resp:  39 38    Height:       Weight:       SpO2: 89% 89% 89% 89%        Anesthesia ROS/Med Hx and Physical Exam     Patient summary review

## 2017-02-11 NOTE — PROGRESS NOTES
[facility] - [facility]    Cardiology - [facility]  Progress Note    Robby Gowers Patient Status:  Inpatient    3/24/1945 MRN [id number]   [facility] 2W/SW Attending Kathy Radford Day # 4 PCP Rakan Anton MD nontender  Ext - warm, well-perfused  Neuro - alert, no facial droop or gross deficits  Psych - cooperative, calm    Results:     Lab Results  Component Value Date   WBC 16.6* 02/10/2017   HGB 12.4* 02/10/2017   HCT 39.0* 02/10/2017    02/10/2017

## 2017-02-11 NOTE — PROGRESS NOTES
Pt intubated fibrooptic per anesthesia. Awake and alert, pt consented. Tolerated fair. Diprivan bolus given, sbp decreased to 67, ns iv bolus infusing. Sat slowly increased to 93%, hr 97, rr 35. Suctioned thick bloody secretion out.  Will repeat abg in 1hr.

## 2017-02-11 NOTE — PROGRESS NOTES
Pt is on 100% bipap mask, alert, oreinted and follows command, post neb treatment attemted to switch to vapotherm. Pt desat to 81% consistently. bipap mask back on. Sat 90-92% with rr 38.

## 2017-02-11 NOTE — PROGRESS NOTES
[facility]    Progress Note    Ramos Clifford Patient Status:  Inpatient    3/24/1945 MRN [id number]   [facility] 2W/SW Attending Kathy Radford Day # 4 PCP Irma Soler MD     Subjective: nephropathy  We will start sliding scale coverage hold metformin for now.     Acute on chronic kidney disease stage III  We will avoid nephrotoxic medication, renally dose current meds repeat BMP in a.m.    resp alkalosis from above    Prophylaxis  Continue

## 2017-02-11 NOTE — PROGRESS NOTES
[facility]     Progress Note        Barbra Galvan Patient Status:  Inpatient    3/24/1945 MRN [id number]   [facility] 2W/SW Attending Kathy Radford Day # 4 PCP Rosi Neville MD       Subjective: mg 10 mg Oral TID (Nitrates)   methimazole (TAPAZOLE) tab 5 mg 5 mg Oral QAM   insulin detemir (LEVEMIR) 100 UNIT/ML flextouch 10 Units 10 Units Subcutaneous Nightly   insulin aspart (NOVOLOG) 100 UNIT/ML flexpen 3 Units 3 Units Subcutaneous TID CC       C [facility], XR CHEST PA + LAT CHEST (CPT=71020), 11/23/2016, 16:32. INDICATIONS: SOB x 2 days history COPD and pulmonary fibrosis. TECHNIQUE:   Single view. FINDINGS: Limited inspiration. CARDIAC/VASC: The heart is mildly enlarged.  Postop change

## 2017-02-12 NOTE — PLAN OF CARE
Problem: RESPIRATORY - ADULT  Goal: Achieves optimal ventilation and oxygenation  INTERVENTIONS:  - Assess for changes in respiratory status  - Assess for changes in mentation and behavior  - Position to facilitate oxygenation and minimize respiratory effo prognosis is dismal. His heart rate is steadily dropping it was in the 90s, now it is dropping to the 30s. The wife is aware that he will pass on soon.

## 2017-02-12 NOTE — PROGRESS NOTES
[facility]    Cardiology - [facility]  Progress Note    Carlee Coombs Patient Status:  Inpatient    3/24/1945 MRN [id number]   [facility] 2W/SW Attending Kathy Radford Day # 5 PCP Crystal Kline MD temperature 97.1 °F (36.2 °C), temperature source Temporal, resp. rate 30, height 5' 11.5\" (1.816 m), weight 178 lb (80.74 kg), SpO2 76 %.   GEN - unresponsive  HEENT - moist mucosa  Neck - JVP 16 cm H2O  Lungs - mech ventilated, diffuse coarse bs  Heart -

## 2017-02-12 NOTE — RESPIRATORY THERAPY NOTE
RT mechanical ventilation progress note    Mechanical ventilation: Patient does not meet criteria for weaning on this shift.   Set Vt 450 ml  Current Ppeak: [id number]  Current shift Pplateau: [id number]  Current shift Rrs:1.4 cmH20/L/sec  Current shift AutoPEEP

## 2017-02-12 NOTE — RESPIRATORY THERAPY NOTE
Pt intubated due to respiratory distress by anesthesia. Pt place on AC 14, 550,100, 5 PEEP. Pt ETT Sen@"[employer]. ETCO2 Color change and bilateral breath sounds. HR 91  SPO2 93  RR 40. Will continue to monitor.

## 2017-02-12 NOTE — PLAN OF CARE
Problem: Patient/Family Goals  Goal: Patient/Family Short Term Goal  Patient’s Short Term Goal: to be able to breathe as he did before admission. Interventions:   - Bipap, as ordered.   - See additional Care Plan goals for specific interventions   Outcom

## 2017-02-12 NOTE — PLAN OF CARE
RESPIRATORY - ADULT    • Achieves optimal ventilation and oxygenation Not Progressing        Patient orally intubated, and just recently intubated within the last few hours. Patient continues to have increased respiration on the ventilator.     Diabetes/Glu

## 2017-02-12 NOTE — PROGRESS NOTES
[facility] was called. Referral number [id number]. Spoke to [facility] coordinator Simone Garcia.   58; [facility] notified about time of death, talked to [place] and says he is possibly eligible for eye and tissue donation.

## 2017-02-12 NOTE — PLAN OF CARE
Safety Risk - Non-Violent Restraints    • Patient will remain free from self-harm Progressing        Pt  restrainted due to ventilated, pulling out tubes, agitated. Bilateral wrist restraints on.

## 2017-02-12 NOTE — PROGRESS NOTES
[facility]     Progress Note        Judye Factor Patient Status:  Inpatient    3/24/1945 MRN [id number]   [facility] 2W/SW Attending Kathy Radford Day # 5 PCP Nedra Cotto MD       Subjective: BID with meals   hydrALAzine HCl (APRESOLINE) tab 10 mg 10 mg Oral TID   rivaroxaban (XARELTO) tab 15 mg 15 mg Oral Daily with food   Rosuvastatin Calcium (CRESTOR) 20 MG tab 20 mg 20 mg Oral Nightly   ondansetron HCl (ZOFRAN) injection 4 mg 4 mg Intraveno pulmonary fibrosis  TECHNIQUE:   Single view. FINDINGS:  CARDIAC/VASC: Stable moderate cardiomegaly. Pulmonary vessels are obscured. ICD and ICD leads unchanged in position. Post open heart surgery. MEDIAST/AMANDA:   No visible mass or adenopathy.  LUNGS/PL Amiodarone pulmonary toxicity  3. COPD  4. Chronic systolic heart failure  5. Obstructive sleep apnea      Plan   -Patient with increased work of breathing and respiratory distress despite continuous BiPAP therapy.   Had a discussion with patient and fam

## 2017-02-12 NOTE — PROGRESS NOTES
Writer just spoke with Dr. Aurea Calloway in regards to patients BP's, and MAP's. MAP goal has been changed verbally over the phone by Dr King White to MAP above 60.  Informed dr that the pt is on 12 mcg of levo with a periphal line, and the patients o2 saturation at

## 2017-02-13 LAB
BASOPHILS # BLD: 0 K/UL (ref 0–0.2)
BASOPHILS NFR BLD: 0 %
EOSINOPHIL # BLD: 0 K/UL (ref 0–0.7)
EOSINOPHIL NFR BLD: 0 %
ERYTHROCYTE [DISTWIDTH] IN BLOOD BY AUTOMATED COUNT: 18.1 % (ref 11–15)
HCT VFR BLD AUTO: 37.2 % (ref 41–52)
HGB BLD-MCNC: 12 G/DL (ref 13.5–17.5)
LYMPHOCYTES # BLD: 0.3 K/UL (ref 1–4)
LYMPHOCYTES NFR BLD: 3 %
MCH RBC QN AUTO: 27.3 PG (ref 27–32)
MCHC RBC AUTO-ENTMCNC: 32.2 G/DL (ref 32–37)
MCV RBC AUTO: 84.9 FL (ref 80–100)
METAMYELOCYTES # BLD MANUAL: 0.77 K/UL
METAMYELOCYTES NFR BLD: 8 %
MONOCYTES # BLD: 0.2 K/UL (ref 0–1)
MONOCYTES NFR BLD: 2 %
NEUTROPHILS # BLD AUTO: 8.4 K/UL (ref 1.8–7.7)
NEUTROPHILS NFR BLD: 49 %
NEUTS BAND NFR BLD: 38 %
PLATELET # BLD AUTO: 215 K/UL (ref 140–400)
PMV BLD AUTO: 9.9 FL (ref 7.4–10.3)
RBC # BLD AUTO: 4.39 M/UL (ref 4.5–5.9)
WBC # BLD AUTO: 9.6 K/UL (ref 4–11)

## 2017-02-13 NOTE — DISCHARGE SUMMARY
[facility]    PATIENT'S NAME: Lexa Bass   ATTENDING PHYSICIAN: Derick Radford MD   PATIENT ACCOUNT#:   [id number]    LOCATION:  [address] [place] [address] RECORD #:   [id number]       YOB: 1945  ADMISSION DATE:       02/ Respiratory alkalosis. 6.   Deep vein thrombosis prophylaxis with Xarelto, which was on until the patient started to have bloody sputum when it was removed. CODE STATUS:  INTUBATE ONLY. CONDITION ON DISCHARGE:  .     FOLLOWUP ACTIVITIES, MED

## 2019-03-01 VITALS — WEIGHT: 186 LBS | SYSTOLIC BLOOD PRESSURE: 122 MMHG | HEART RATE: 65 BPM | DIASTOLIC BLOOD PRESSURE: 70 MMHG

## 2025-02-13 NOTE — PLAN OF CARE
Diabetes/Glucose Control    • Glucose maintained within prescribed range Progressing        Patient/Family Goals    • Patient/Family Long Term Goal Progressing    • Patient/Family Short Term Goal Progressing        RESPIRATORY - ADULT    • Achieves optimal show

## (undated) NOTE — LETTER
Hospital Discharge Documentation    From: [id number] Hospitalist's Office  Phone: [phone]    Patient discharged time/date: 2017 12:38 PM  Patient discharge disposition:         Discharge Summaries - D/C Summary      Discharge Summaries became hypotensive. He was on Levophed to support his blood pressure, and his oxygenation levels were very, very low, and he passed away this afternoon and Dr. Uday Saenz was notified by me. ASSESSMENT AND PLAN:  On his death was:    1.    Acute and chronic

## (undated) NOTE — MR AVS SNAPSHOT
Fabien Reese [address] [place] [address] [phone]               Thank you for choosing us for your health care visit with Tia Abdul NP.   We are glad to serve you and happy to provide Glucose Blood Strp   Check Blood Glucose once daily and PRN. Commonly known as:  MACHO CONTOUR NEXT TEST           hydrALAzine HCl 10 MG Tabs   Take 10 mg by mouth 3 (three) times daily.    Commonly known as:  APRESOLINE           isosorbide dinitrate 10 BUN/CREA Ratio 17.9 10.0-20.0    Anion Gap 7 0-18    Calculated Osmolality 287 275-295 mOsm/kg    GFR, Non- 42 >=60    GFR, -American 51 >=60      Chronic Kidney Disease: GFR <60 ml/min/1.73 m2  Kidney failure: GFR <15 ml/min/1.73 m